# Patient Record
Sex: MALE | Race: WHITE | Employment: OTHER | URBAN - NONMETROPOLITAN AREA
[De-identification: names, ages, dates, MRNs, and addresses within clinical notes are randomized per-mention and may not be internally consistent; named-entity substitution may affect disease eponyms.]

---

## 2017-01-13 ENCOUNTER — OFFICE VISIT (OUTPATIENT)
Dept: CARDIOLOGY | Age: 67
End: 2017-01-13

## 2017-01-13 VITALS
HEIGHT: 68 IN | SYSTOLIC BLOOD PRESSURE: 118 MMHG | BODY MASS INDEX: 32.89 KG/M2 | DIASTOLIC BLOOD PRESSURE: 70 MMHG | HEART RATE: 59 BPM | WEIGHT: 217 LBS

## 2017-01-13 DIAGNOSIS — Z86.79 HISTORY OF CORONARY ARTERY DISEASE: Primary | ICD-10-CM

## 2017-01-13 DIAGNOSIS — E78.01 FAMILIAL HYPERCHOLESTEROLEMIA: ICD-10-CM

## 2017-01-13 DIAGNOSIS — I10 ESSENTIAL HYPERTENSION: ICD-10-CM

## 2017-01-13 PROCEDURE — G8419 CALC BMI OUT NRM PARAM NOF/U: HCPCS | Performed by: NUCLEAR MEDICINE

## 2017-01-13 PROCEDURE — G8484 FLU IMMUNIZE NO ADMIN: HCPCS | Performed by: NUCLEAR MEDICINE

## 2017-01-13 PROCEDURE — G8427 DOCREV CUR MEDS BY ELIG CLIN: HCPCS | Performed by: NUCLEAR MEDICINE

## 2017-01-13 PROCEDURE — 1123F ACP DISCUSS/DSCN MKR DOCD: CPT | Performed by: NUCLEAR MEDICINE

## 2017-01-13 PROCEDURE — G8598 ASA/ANTIPLAT THER USED: HCPCS | Performed by: NUCLEAR MEDICINE

## 2017-01-13 PROCEDURE — 3017F COLORECTAL CA SCREEN DOC REV: CPT | Performed by: NUCLEAR MEDICINE

## 2017-01-13 PROCEDURE — 4040F PNEUMOC VAC/ADMIN/RCVD: CPT | Performed by: NUCLEAR MEDICINE

## 2017-01-13 PROCEDURE — 99213 OFFICE O/P EST LOW 20 MIN: CPT | Performed by: NUCLEAR MEDICINE

## 2017-01-13 PROCEDURE — 93000 ELECTROCARDIOGRAM COMPLETE: CPT | Performed by: NUCLEAR MEDICINE

## 2017-01-13 PROCEDURE — 1036F TOBACCO NON-USER: CPT | Performed by: NUCLEAR MEDICINE

## 2017-01-13 RX ORDER — CARVEDILOL 25 MG/1
25 TABLET ORAL 2 TIMES DAILY WITH MEALS
Qty: 180 TABLET | Refills: 3 | Status: SHIPPED | OUTPATIENT
Start: 2017-01-13 | End: 2018-01-12 | Stop reason: SDUPTHER

## 2018-01-12 ENCOUNTER — OFFICE VISIT (OUTPATIENT)
Dept: CARDIOLOGY CLINIC | Age: 68
End: 2018-01-12
Payer: MEDICARE

## 2018-01-12 VITALS
WEIGHT: 227 LBS | BODY MASS INDEX: 34.4 KG/M2 | HEART RATE: 61 BPM | DIASTOLIC BLOOD PRESSURE: 60 MMHG | SYSTOLIC BLOOD PRESSURE: 122 MMHG | HEIGHT: 68 IN

## 2018-01-12 DIAGNOSIS — E78.01 FAMILIAL HYPERCHOLESTEROLEMIA: ICD-10-CM

## 2018-01-12 DIAGNOSIS — I25.10 ASHD (ARTERIOSCLEROTIC HEART DISEASE): Primary | ICD-10-CM

## 2018-01-12 DIAGNOSIS — I10 ESSENTIAL HYPERTENSION: ICD-10-CM

## 2018-01-12 PROCEDURE — 93000 ELECTROCARDIOGRAM COMPLETE: CPT | Performed by: NUCLEAR MEDICINE

## 2018-01-12 PROCEDURE — 99213 OFFICE O/P EST LOW 20 MIN: CPT | Performed by: NUCLEAR MEDICINE

## 2018-01-12 RX ORDER — CARVEDILOL 25 MG/1
25 TABLET ORAL 2 TIMES DAILY WITH MEALS
Qty: 180 TABLET | Refills: 3 | Status: SHIPPED | OUTPATIENT
Start: 2018-01-12 | End: 2019-01-17 | Stop reason: SDUPTHER

## 2018-01-12 RX ORDER — ROPINIROLE 5 MG/1
5 TABLET, FILM COATED ORAL 3 TIMES DAILY
COMMUNITY
End: 2018-12-30 | Stop reason: ALTCHOICE

## 2018-01-12 RX ORDER — CYCLOBENZAPRINE HCL 10 MG
10 TABLET ORAL 3 TIMES DAILY PRN
COMMUNITY
End: 2018-12-30 | Stop reason: ALTCHOICE

## 2018-01-12 RX ORDER — BUPROPION HYDROCHLORIDE 150 MG/1
150 TABLET, EXTENDED RELEASE ORAL 2 TIMES DAILY
COMMUNITY
End: 2018-12-30 | Stop reason: ALTCHOICE

## 2018-01-12 NOTE — PROGRESS NOTES
[facility]  [facility]  [address]  [address]  [address] [address]  Dept: [phone]  Dept Fax: [phone]  Loc: [phone]    Visit Date: 1/12/2018    Ronald Dash is a 69 y.o. male who presents today for:  Chief Complaint   Patient presents with    1 Year Follow Up    Coronary Artery Disease    Hyperlipidemia     No chest pain   Some dyspnea from asthma   Known mild CAD  Stable BP   No changes in breathing  No chest pain     HPI:  HPI  Past Medical History:   Diagnosis Date    ASHD (arteriosclerotic heart disease)     Depression     History of chest pain     History of coronary artery disease     HLD (hyperlipidemia)     HTN (hypertension)     Hypothyroidism     Obstructive sleep apnea on CPAP     Pneumonia     RLS (restless legs syndrome)       Past Surgical History:   Procedure Laterality Date    CARDIAC CATHETERIZATION  12/13/2007    Mild nonobstructive coronary artery disease. Normal left ventricular size and systolic function. EF 65%. No regional wall motion abnormalities. No MR    CARDIOVASCULAR STRESS TEST  05/07/2009    No evidence of Persantine induced ischemia, infarct, or scar. Normal wall motion and contractility. EF 68%. History reviewed. No pertinent family history.   Social History   Substance Use Topics    Smoking status: Former Smoker    Smokeless tobacco: Not on file    Alcohol use No      Current Outpatient Prescriptions   Medication Sig Dispense Refill    buPROPion (WELLBUTRIN SR) 150 MG extended release tablet Take 150 mg by mouth 2 times daily      cyclobenzaprine (FLEXERIL) 10 MG tablet Take 10 mg by mouth 3 times daily as needed for Muscle spasms      rOPINIRole (REQUIP) 5 MG tablet Take 5 mg by mouth 3 times daily      DULoxetine (CYMBALTA) 60 MG extended release capsule Take 60 mg by mouth daily      carvedilol (COREG) 25 MG tablet Take 1 tablet by mouth 2 times daily (with meals) 180 tablet 3    Nebulizers (NEBULIZER Well developed, well nourished  Lungs:    Clear to auscultation  Heart:    Normal S1 S2, Slight murmur. no rubs, no gallops  Abdomen:   Soft, non tender, no organomegalies, positive bowel sounds  Extremities:   No edema, no cyanosis, good peripheral pulses  Neurological:   Awake, alert, oriented. No obvious focal deficits  Musculoskelatal:  No obvious deformities    Assessment:     1. ASHD (arteriosclerotic heart disease)  EKG 12 Lead   2. Familial hypercholesterolemia     cardiac fair for now   ECG in office was done today. I reviewed the ECG. No acute findings      Plan:  No Follow-up on file. As above  Continue risk factor modification and medical management  Thank you for allowing me to participate in the care of your patient. Please don't hesitate to contact me regarding any further issues related to the patient care    Orders Placed:  Orders Placed This Encounter   Procedures    EKG 12 Lead     Order Specific Question:   Reason for Exam?     Answer: Other       Medications Prescribed:  No orders of the defined types were placed in this encounter. Discussed use, benefit, and side effects of prescribed medications. All patient questions answered. Pt voiced understanding. Instructed to continue current medications, diet and exercise. Continue risk factor modification and medical management. Patient agreed with treatment plan. Follow up as directed.     Electronically signed by Luis Daniel Worthy MD on 1/12/2018 at 11:18 AM

## 2018-01-19 LAB
ALBUMIN SERPL-MCNC: 3.9 G/DL (ref 3.2–5.3)
ALK PHOSPHATASE: 94 IU/L (ref 35–121)
ALT SERPL-CCNC: 14 IU/L (ref 5–59)
AST SERPL-CCNC: 18 IU/L (ref 10–42)
BILIRUB SERPL-MCNC: 0.5 MG/DL (ref 0.2–1.3)
BILIRUBIN DIRECT: 0.1 MG/DL (ref 0–0.2)
CHOLESTEROL/HDL RATIO: 2.7
CHOLESTEROL: 114 MG/DL
HDLC SERPL-MCNC: 43 MG/DL (ref 40–60)
LDL CHOLESTEROL CALCULATED: 53 MG/DL
LDL/HDL RATIO: 1.2
TOTAL PROTEIN: 6.2 G/DL (ref 5.8–8)
TRIGL SERPL-MCNC: 90 MG/DL
VLDLC SERPL CALC-MCNC: 18 MG/DL

## 2018-12-30 ENCOUNTER — HOSPITAL ENCOUNTER (EMERGENCY)
Age: 68
Discharge: HOME | End: 2018-12-30
Payer: MEDICARE

## 2018-12-30 VITALS
HEART RATE: 64 BPM | RESPIRATION RATE: 18 BRPM | SYSTOLIC BLOOD PRESSURE: 132 MMHG | DIASTOLIC BLOOD PRESSURE: 65 MMHG | OXYGEN SATURATION: 96 % | HEIGHT: 68 IN | TEMPERATURE: 97.9 F | BODY MASS INDEX: 33.34 KG/M2 | WEIGHT: 220 LBS

## 2018-12-30 DIAGNOSIS — R21 RASH AND OTHER NONSPECIFIC SKIN ERUPTION: ICD-10-CM

## 2018-12-30 DIAGNOSIS — J40 BRONCHITIS: Primary | ICD-10-CM

## 2018-12-30 PROCEDURE — 99213 OFFICE O/P EST LOW 20 MIN: CPT | Performed by: NURSE PRACTITIONER

## 2018-12-30 PROCEDURE — 99212 OFFICE O/P EST SF 10 MIN: CPT

## 2018-12-30 RX ORDER — FLUVOXAMINE MALEATE 50 MG/1
50 TABLET, COATED ORAL NIGHTLY
COMMUNITY
End: 2019-02-14

## 2018-12-30 RX ORDER — LEVOFLOXACIN 500 MG/1
500 TABLET, FILM COATED ORAL DAILY
Qty: 7 TABLET | Refills: 0 | Status: SHIPPED | OUTPATIENT
Start: 2018-12-30 | End: 2019-01-06

## 2018-12-30 RX ORDER — PREDNISONE 20 MG/1
40 TABLET ORAL DAILY
Qty: 14 TABLET | Refills: 0 | Status: SHIPPED | OUTPATIENT
Start: 2018-12-30 | End: 2019-01-06

## 2018-12-30 ASSESSMENT — ENCOUNTER SYMPTOMS
SHORTNESS OF BREATH: 0
COUGH: 1
ABDOMINAL PAIN: 0
WHEEZING: 1
VOMITING: 0
NAUSEA: 0
CHEST TIGHTNESS: 0

## 2019-01-17 RX ORDER — CARVEDILOL 25 MG/1
25 TABLET ORAL 2 TIMES DAILY WITH MEALS
Qty: 180 TABLET | Refills: 0 | Status: SHIPPED | OUTPATIENT
Start: 2019-01-17 | End: 2019-01-18 | Stop reason: SDUPTHER

## 2019-01-19 RX ORDER — CARVEDILOL 25 MG/1
25 TABLET ORAL 2 TIMES DAILY WITH MEALS
Qty: 180 TABLET | Refills: 0 | Status: SHIPPED | OUTPATIENT
Start: 2019-01-19 | End: 2019-04-12 | Stop reason: SDUPTHER

## 2019-02-14 ENCOUNTER — OFFICE VISIT (OUTPATIENT)
Dept: CARDIOLOGY CLINIC | Age: 69
End: 2019-02-14
Payer: MEDICARE

## 2019-02-14 VITALS
HEART RATE: 57 BPM | WEIGHT: 220 LBS | HEIGHT: 68 IN | SYSTOLIC BLOOD PRESSURE: 134 MMHG | DIASTOLIC BLOOD PRESSURE: 70 MMHG | BODY MASS INDEX: 33.34 KG/M2

## 2019-02-14 DIAGNOSIS — I10 ESSENTIAL HYPERTENSION: Primary | ICD-10-CM

## 2019-02-14 DIAGNOSIS — E78.5 HYPERLIPIDEMIA, UNSPECIFIED HYPERLIPIDEMIA TYPE: ICD-10-CM

## 2019-02-14 DIAGNOSIS — I25.10 ASHD (ARTERIOSCLEROTIC HEART DISEASE): ICD-10-CM

## 2019-02-14 DIAGNOSIS — Z86.79 HISTORY OF CORONARY ARTERY DISEASE: ICD-10-CM

## 2019-02-14 PROCEDURE — 93000 ELECTROCARDIOGRAM COMPLETE: CPT | Performed by: NUCLEAR MEDICINE

## 2019-02-14 PROCEDURE — 99213 OFFICE O/P EST LOW 20 MIN: CPT | Performed by: NUCLEAR MEDICINE

## 2019-02-14 RX ORDER — ALPRAZOLAM 0.25 MG/1
0.25 TABLET ORAL NIGHTLY PRN
COMMUNITY

## 2019-02-14 RX ORDER — BRIMONIDINE TARTRATE 2 MG/ML
1 SOLUTION/ DROPS OPHTHALMIC 3 TIMES DAILY
COMMUNITY
End: 2021-03-29

## 2019-02-14 RX ORDER — MULTIVIT WITH MINERALS/LUTEIN
250 TABLET ORAL DAILY
COMMUNITY

## 2019-04-12 RX ORDER — CARVEDILOL 25 MG/1
25 TABLET ORAL 2 TIMES DAILY WITH MEALS
Qty: 180 TABLET | Refills: 3 | Status: SHIPPED | OUTPATIENT
Start: 2019-04-12 | End: 2020-04-08 | Stop reason: SDUPTHER

## 2019-04-12 NOTE — TELEPHONE ENCOUNTER
Sven Chaudhary called requesting a refill on the following medications:  Requested Prescriptions     Pending Prescriptions Disp Refills    carvedilol (COREG) 25 MG tablet 180 tablet 0     Sig: Take 1 tablet by mouth 2 times daily (with meals)     Pharmacy verified:  .pv    [facility]    Date of last visit: 2/14/19  Date of next visit (if applicable): 8/47/85

## 2019-07-13 ENCOUNTER — HOSPITAL ENCOUNTER (EMERGENCY)
Age: 69
Discharge: HOME | End: 2019-07-13
Attending: EMERGENCY MEDICINE
Payer: MEDICARE

## 2019-07-13 VITALS
WEIGHT: 218 LBS | RESPIRATION RATE: 22 BRPM | DIASTOLIC BLOOD PRESSURE: 60 MMHG | HEART RATE: 56 BPM | HEIGHT: 68 IN | BODY MASS INDEX: 33.04 KG/M2 | TEMPERATURE: 98.5 F | SYSTOLIC BLOOD PRESSURE: 125 MMHG | OXYGEN SATURATION: 95 %

## 2019-07-13 DIAGNOSIS — J44.1 COPD WITH ACUTE EXACERBATION (HCC): ICD-10-CM

## 2019-07-13 DIAGNOSIS — J20.9 ACUTE BRONCHITIS DUE TO INFECTION: Primary | ICD-10-CM

## 2019-07-13 PROCEDURE — 99214 OFFICE O/P EST MOD 30 MIN: CPT | Performed by: EMERGENCY MEDICINE

## 2019-07-13 PROCEDURE — 99213 OFFICE O/P EST LOW 20 MIN: CPT

## 2019-07-13 RX ORDER — PREDNISONE 20 MG/1
20 TABLET ORAL DAILY
Qty: 7 TABLET | Refills: 0 | Status: SHIPPED | OUTPATIENT
Start: 2019-07-13 | End: 2019-07-20

## 2019-07-13 RX ORDER — ALBUTEROL SULFATE 2.5 MG/3ML
2.5 SOLUTION RESPIRATORY (INHALATION) EVERY 6 HOURS PRN
COMMUNITY
End: 2021-03-29

## 2019-07-13 RX ORDER — DOXYCYCLINE HYCLATE 100 MG
100 TABLET ORAL 2 TIMES DAILY
Qty: 14 TABLET | Refills: 0 | Status: SHIPPED | OUTPATIENT
Start: 2019-07-13 | End: 2019-07-20

## 2019-07-13 RX ORDER — RISPERIDONE 0.5 MG/1
0.5 TABLET, FILM COATED ORAL EVERY EVENING
COMMUNITY
End: 2020-02-19 | Stop reason: ALTCHOICE

## 2019-07-13 RX ORDER — LATANOPROST 50 UG/ML
1 SOLUTION/ DROPS OPHTHALMIC NIGHTLY
COMMUNITY

## 2019-07-13 ASSESSMENT — ENCOUNTER SYMPTOMS
TROUBLE SWALLOWING: 0
SINUS PRESSURE: 0
COUGH: 1
EYE DISCHARGE: 0
BACK PAIN: 0
EYE PAIN: 0
STRIDOR: 0
SHORTNESS OF BREATH: 1
VOICE CHANGE: 0
DIARRHEA: 0
NAUSEA: 0
WHEEZING: 1
EYE REDNESS: 0
VOMITING: 0
SORE THROAT: 0
ABDOMINAL PAIN: 0

## 2019-07-13 NOTE — ED NOTES
No change in patients condition. Discharge instructions and prescriptions discussed with pt. Pt. Verbalized understanding of info given and ambulated to exit in stable condition.       Dickson Saldaña RN  07/13/19 [id number]

## 2020-02-17 ENCOUNTER — HOSPITAL ENCOUNTER (OUTPATIENT)
Age: 70
Setting detail: SPECIMEN
Discharge: HOME | End: 2020-02-17
Payer: MEDICARE

## 2020-02-17 LAB
ALBUMIN SERPL-MCNC: 3.6 G/DL (ref 3.5–5.2)
ALBUMIN/GLOBULIN RATIO: 1.3 (ref 1–2.5)
ALP BLD-CCNC: 107 U/L (ref 40–129)
ALT SERPL-CCNC: 18 U/L (ref 5–41)
AST SERPL-CCNC: 23 U/L
BILIRUB SERPL-MCNC: 0.27 MG/DL (ref 0.3–1.2)
BILIRUBIN DIRECT: 0.09 MG/DL
BILIRUBIN, INDIRECT: 0.18 MG/DL (ref 0–1)
CHOLESTEROL/HDL RATIO: 2.5
CHOLESTEROL: 111 MG/DL
GLOBULIN: ABNORMAL G/DL (ref 1.5–3.8)
HDLC SERPL-MCNC: 44 MG/DL
LDL CHOLESTEROL: 52 MG/DL (ref 0–130)
TOTAL PROTEIN: 6.3 G/DL (ref 6.4–8.3)
TRIGL SERPL-MCNC: 74 MG/DL
VLDLC SERPL CALC-MCNC: NORMAL MG/DL (ref 1–30)

## 2020-02-19 ENCOUNTER — OFFICE VISIT (OUTPATIENT)
Dept: CARDIOLOGY CLINIC | Age: 70
End: 2020-02-19
Payer: MEDICARE

## 2020-02-19 VITALS
SYSTOLIC BLOOD PRESSURE: 136 MMHG | HEIGHT: 68 IN | WEIGHT: 223 LBS | DIASTOLIC BLOOD PRESSURE: 64 MMHG | HEART RATE: 57 BPM | BODY MASS INDEX: 33.8 KG/M2

## 2020-02-19 PROCEDURE — 93000 ELECTROCARDIOGRAM COMPLETE: CPT | Performed by: NUCLEAR MEDICINE

## 2020-02-19 PROCEDURE — 99213 OFFICE O/P EST LOW 20 MIN: CPT | Performed by: NUCLEAR MEDICINE

## 2020-02-19 RX ORDER — QUETIAPINE FUMARATE 100 MG/1
300 TABLET, FILM COATED ORAL NIGHTLY
COMMUNITY

## 2020-02-19 RX ORDER — QUETIAPINE FUMARATE 100 MG/1
100 TABLET, FILM COATED ORAL EVERY MORNING
COMMUNITY
End: 2021-02-19

## 2020-02-19 NOTE — PROGRESS NOTES
carvedilol (COREG) 25 MG tablet Take 1 tablet by mouth 2 times daily (with meals) 180 tablet 3    ALPRAZolam (XANAX) 0.25 MG tablet Take 0.25 mg by mouth nightly as needed for Sleep. Yovany Molatonya brimonidine (ALPHAGAN) 0.2 % ophthalmic solution 1 drop 3 times daily      Ascorbic Acid (VITAMIN C) 250 MG tablet Take 250 mg by mouth daily      DULoxetine (CYMBALTA) 60 MG extended release capsule Take 30 mg by mouth daily       budesonide-formoterol (SYMBICORT) 80-4.5 MCG/ACT AERO Inhale 2 puffs into the lungs 2 times daily      Nebulizers (NEBULIZER COMPRESSOR) MISC Take 1 vial by nebulization 4 times daily DX   acute bronchitis    ICD 10 (J20.9) 1 each 0    acetaminophen (TYLENOL) 500 MG tablet Take 500 mg by mouth every 6 hours as needed for Pain.  Multiple Vitamins-Minerals (MULTIVITAMIN PO) Take  by mouth.  simvastatin (ZOCOR) 40 MG tablet Take 1 tablet by mouth nightly. 90 tablet 3    levothyroxine (SYNTHROID) 100 MCG tablet Take 100 mcg by mouth daily       aspirin 81 MG chewable tablet Take 81 mg by mouth daily.  vitamin B-12 (CYANOCOBALAMIN) 1000 MCG tablet Take 1,000 mcg by mouth daily.  Coenzyme Q10 (CO Q-10) 120 MG CAPS Take  by mouth. No current facility-administered medications for this visit.       Allergies   Allergen Reactions    Sulfa Antibiotics Other (See Comments)     Dizzy and chest tightness     Health Maintenance   Topic Date Due    TSH testing  1950    AAA screen  1950    Hepatitis C screen  1950    DTaP/Tdap/Td vaccine (1 - Tdap) 07/05/1959    Shingles Vaccine (1 of 2) 07/05/1998    Colon cancer screen colonoscopy  07/05/1998    Pneumococcal 65+ years Vaccine (1 of 1 - PPSV23) 07/05/2013    Annual Wellness Visit (AWV)  06/23/2019    Flu vaccine (1) 09/01/2019    Lipid screen  02/17/2021    Hepatitis A vaccine  Aged Out    Hepatitis B vaccine  Aged Out    Hib vaccine  Aged Out    Meningococcal (ACWY) vaccine  Aged Out Subjective:  Review of Systems  General:   No fever, no chills, No fatigue or weight loss  Pulmonary:    No dyspnea, no wheezing  Cardiac:    Denies recent chest pain,   GI:     No nausea or vomiting, no abdominal pain  Neuro:    No dizziness or light headedness,   Musculoskeletal:  No recent active issues  Extremities:   No edema, no obvious claudication       Objective:  Physical Exam  /64   Pulse 57   Ht 5' 8\" (1.727 m)   Wt 223 lb (101.2 kg)   BMI 33.91 kg/m²   General:   Well developed, well nourished  Lungs:   Clear to auscultation  Heart:    Normal S1 S2, Slight murmur. no rubs, no gallops  Abdomen:   Soft, non tender, no organomegalies, positive bowel sounds  Extremities:   No edema, no cyanosis, good peripheral pulses  Neurological:   Awake, alert, oriented. No obvious focal deficits  Musculoskelatal:  No obvious deformities    Assessment:      Diagnosis Orders   1. Essential hypertension  EKG 12 lead   2. ASHD (arteriosclerotic heart disease)  EKG 12 lead   3. Familial hypercholesterolemia     cardiac fair for now  ECG in office was done today. I reviewed the ECG. No acute findings      Plan:  No follow-ups on file. As above  Continue risk factor modification and medical management  Thank you for allowing me to participate in the care of your patient. Please don't hesitate to contact me regarding any further issues related to the patient care    Orders Placed:  Orders Placed This Encounter   Procedures    EKG 12 lead     Order Specific Question:   Reason for Exam?     Answer: Other       Medications Prescribed:  No orders of the defined types were placed in this encounter. Discussed use, benefit, and side effects of prescribed medications. All patient questions answered. Pt voicedunderstanding. Instructed to continue current medications, diet and exercise. Continue risk factor modification and medical management. Patient agreed with treatment plan.  Follow up as

## 2020-04-07 NOTE — TELEPHONE ENCOUNTER
Bryan Venegas called requesting a refill on the following medications:  Requested Prescriptions     Pending Prescriptions Disp Refills    carvedilol (COREG) 25 MG tablet 180 tablet 3     Sig: Take 1 tablet by mouth 2 times daily (with meals)     Pharmacy verified:  .pv    [facility]    Date of last visit: 02/19/20  Date of next visit (if applicable):02/19/21

## 2020-04-08 RX ORDER — CARVEDILOL 25 MG/1
25 TABLET ORAL 2 TIMES DAILY WITH MEALS
Qty: 180 TABLET | Refills: 3 | Status: SHIPPED | OUTPATIENT
Start: 2020-04-08 | End: 2021-02-19 | Stop reason: SDUPTHER

## 2021-02-19 ENCOUNTER — OFFICE VISIT (OUTPATIENT)
Dept: CARDIOLOGY CLINIC | Age: 71
End: 2021-02-19
Payer: MEDICARE

## 2021-02-19 VITALS
WEIGHT: 220 LBS | SYSTOLIC BLOOD PRESSURE: 130 MMHG | BODY MASS INDEX: 33.45 KG/M2 | DIASTOLIC BLOOD PRESSURE: 64 MMHG | HEART RATE: 55 BPM

## 2021-02-19 DIAGNOSIS — I10 ESSENTIAL HYPERTENSION: ICD-10-CM

## 2021-02-19 DIAGNOSIS — I25.10 CORONARY ARTERY DISEASE INVOLVING NATIVE CORONARY ARTERY OF NATIVE HEART WITHOUT ANGINA PECTORIS: ICD-10-CM

## 2021-02-19 DIAGNOSIS — R06.02 SHORTNESS OF BREATH: Primary | ICD-10-CM

## 2021-02-19 PROCEDURE — 93000 ELECTROCARDIOGRAM COMPLETE: CPT | Performed by: NUCLEAR MEDICINE

## 2021-02-19 PROCEDURE — 99213 OFFICE O/P EST LOW 20 MIN: CPT | Performed by: NUCLEAR MEDICINE

## 2021-02-19 RX ORDER — SIMVASTATIN 40 MG
40 TABLET ORAL NIGHTLY
Qty: 90 TABLET | Refills: 3 | Status: SHIPPED | OUTPATIENT
Start: 2021-02-19 | End: 2022-02-25 | Stop reason: SDUPTHER

## 2021-02-19 RX ORDER — VITAMIN B COMPLEX
1000 TABLET ORAL DAILY
COMMUNITY

## 2021-02-19 RX ORDER — CARVEDILOL 25 MG/1
25 TABLET ORAL DAILY
Qty: 90 TABLET | Refills: 3 | Status: SHIPPED | OUTPATIENT
Start: 2021-02-19 | End: 2021-06-01 | Stop reason: SDUPTHER

## 2021-02-19 NOTE — PROGRESS NOTES
[address] [facility]  [address] [place] [address]  Dept: [phone]  Dept Fax: [phone]  Loc: [phone]    Visit Date: 2/19/2021    Micheline Jacob is a 65 y.o. male who presents todayfor:  Chief Complaint   Patient presents with    1 Year Follow Up    Coronary Artery Disease    Hypertension    Hyperlipidemia     Know mild CAD  No chest pain   No changes in breathing  Some baseline dyspnea  Bp is stable  No dizziness   No syncope  Active     HPI:  HPI  Past Medical History:   Diagnosis Date    ASHD (arteriosclerotic heart disease)     Depression     History of chest pain     History of coronary artery disease     HLD (hyperlipidemia)     HTN (hypertension)     Hypothyroidism     Obstructive sleep apnea on CPAP     Pneumonia     RLS (restless legs syndrome)       Past Surgical History:   Procedure Laterality Date    CARDIAC CATHETERIZATION  12/13/2007    Mild nonobstructive coronary artery disease. Normal left ventricular size and systolic function. EF 65%. No regional wall motion abnormalities. No MR    CARDIOVASCULAR STRESS TEST  05/07/2009    No evidence of Persantine induced ischemia, infarct, or scar. Normal wall motion and contractility. EF 68%.       Family History   Problem Relation Age of Onset    Arthritis Mother     Other Mother         pneumonia cause of death    Arthritis Father     Stroke Father      Social History     Tobacco Use    Smoking status: Former Smoker    Smokeless tobacco: Never Used   Substance Use Topics    Alcohol use: No      Current Outpatient Medications   Medication Sig Dispense Refill    Vitamin D (CHOLECALCIFEROL) 25 MCG (1000 UT) TABS tablet Take 1,000 Units by mouth daily      carvedilol (COREG) 25 MG tablet Take 1 tablet by mouth 2 times daily (with meals) (Patient taking differently: Take 25 mg by mouth daily ) 180 tablet 3  QUEtiapine (SEROQUEL) 100 MG tablet Take 300 mg by mouth nightly       albuterol (PROVENTIL) (2.5 MG/3ML) 0.083% nebulizer solution Take 2.5 mg by nebulization every 6 hours as needed for Wheezing      latanoprost (XALATAN) 0.005 % ophthalmic solution 1 drop nightly      ALPRAZolam (XANAX) 0.25 MG tablet Take 0.25 mg by mouth nightly as needed for Sleep. [place] brimonidine (ALPHAGAN) 0.2 % ophthalmic solution 1 drop 3 times daily      Ascorbic Acid (VITAMIN C) 250 MG tablet Take 250 mg by mouth daily      DULoxetine HCl 30 MG CSDR Take 30 mg by mouth daily       budesonide-formoterol (SYMBICORT) 80-4.5 MCG/ACT AERO Inhale 2 puffs into the lungs 2 times daily      Nebulizers (NEBULIZER COMPRESSOR) MISC Take 1 vial by nebulization 4 times daily DX   acute bronchitis    ICD 10 (J20.9) 1 each 0    acetaminophen (TYLENOL) 500 MG tablet Take 500 mg by mouth every 6 hours as needed for Pain.  Multiple Vitamins-Minerals (MULTIVITAMIN PO) Take  by mouth.  simvastatin (ZOCOR) 40 MG tablet Take 1 tablet by mouth nightly. 90 tablet 3    levothyroxine (SYNTHROID) 100 MCG tablet Take 100 mcg by mouth daily       aspirin 81 MG chewable tablet Take 81 mg by mouth daily.  vitamin B-12 (CYANOCOBALAMIN) 1000 MCG tablet Take 1,000 mcg by mouth daily.  Coenzyme Q10 (CO Q-10) 120 MG CAPS Take  by mouth. No current facility-administered medications for this visit.       Allergies   Allergen Reactions    Sulfa Antibiotics Other (See Comments)     Dizzy and chest tightness     Health Maintenance   Topic Date Due    TSH testing  1950    AAA screen  1950    Hepatitis C screen  1950    COVID-19 Vaccine (1 of 2) 07/05/1964    DTaP/Tdap/Td vaccine (1 - Tdap) 07/05/1967    Shingles Vaccine (1 of 2) 07/05/1998    Colon cancer screen colonoscopy  07/05/1998    Pneumococcal 65+ years Vaccine (1 of 1 - PPSV23) 07/05/2013    Annual Wellness Visit (AWV)  06/23/2019  Flu vaccine (1) 09/01/2020    Lipid screen  02/17/2021    Hepatitis A vaccine  Aged Out    Hepatitis B vaccine  Aged Out    Hib vaccine  Aged Out    Meningococcal (ACWY) vaccine  Aged Out       Subjective:  Review of Systems  General:   No fever, no chills, No fatigue or weight loss  Pulmonary:    No dyspnea, no wheezing  Cardiac:    Denies recent chest pain,   GI:     No nausea or vomiting, no abdominal pain  Neuro:    No dizziness or light headedness,   Musculoskeletal:  No recent active issues  Extremities:   No edema, no obvious claudication       Objective:  Physical Exam  /64   Pulse 55   Wt 220 lb (99.8 kg)   BMI 33.45 kg/m²   General:   Well developed, well nourished  Lungs:   Clear to auscultation  Heart:    Normal S1 S2, Slight murmur. no rubs, no gallops  Abdomen:   Soft, non tender, no organomegalies, positive bowel sounds  Extremities:   No edema, no cyanosis, good peripheral pulses  Neurological:   Awake, alert, oriented. No obvious focal deficits  Musculoskelatal:  No obvious deformities    Assessment:      Diagnosis Orders   1. Shortness of breath  EKG 12 lead   2. Coronary artery disease involving native coronary artery of native heart without angina pectoris     3. Essential hypertension     as above  Cardiac fair for now   ECG in office was done today. I reviewed the ECG. No acute findings    Plan:  No follow-ups on file. As above  Check lipids  Continue risk factor modification and medical management. ty    Orders Placed:  Orders Placed This Encounter   Procedures    EKG 12 lead     Order Specific Question:   Reason for Exam?     Answer: Other       Medications Prescribed:  No orders of the defined types were placed in this encounter. Discussed use, benefit, and side effects of prescribed medications. All patient questions answered. Pt voicedunderstanding. Instructed to continue current medications, diet and exercise. Continue risk factor modification and medical management. Patient agreed with treatment plan. Follow up as directed.     Electronically signedby Tico Garcia MD on 2/19/2021 at 1:47 PM

## 2021-02-23 LAB
ALBUMIN SERPL-MCNC: 3.8 G/DL (ref 3.2–5.3)
ALK PHOSPHATASE: 101 U/L (ref 39–130)
ALT SERPL-CCNC: 15 U/L (ref 0–40)
AST SERPL-CCNC: 19 U/L (ref 0–41)
BILIRUB SERPL-MCNC: 0.4 MG/DL (ref 0.3–1.2)
BILIRUBIN DIRECT: 0.1 MG/DL (ref 0–0.4)
CHOLESTEROL/HDL RATIO: 2.8 (ref 1–5)
CHOLESTEROL: 97 MG/DL (ref 150–200)
HDLC SERPL-MCNC: 35 MG/DL
LDL CHOLESTEROL CALCULATED: 46 MG/DL
LDL/HDL RATIO: 1.3
TOTAL PROTEIN: 6.1 G/DL (ref 6–8)
TRIGL SERPL-MCNC: 79 MG/DL (ref 27–150)
VLDLC SERPL CALC-MCNC: 16 MG/DL (ref 0–30)

## 2021-06-01 RX ORDER — CARVEDILOL 25 MG/1
25 TABLET ORAL 2 TIMES DAILY WITH MEALS
Qty: 30 TABLET | Refills: 0 | Status: SHIPPED | OUTPATIENT
Start: 2021-06-01 | End: 2022-02-25 | Stop reason: SDUPTHER

## 2021-06-02 RX ORDER — CARVEDILOL 25 MG/1
25 TABLET ORAL 2 TIMES DAILY
Qty: 180 TABLET | Refills: 3 | Status: CANCELLED | OUTPATIENT
Start: 2021-06-02

## 2021-06-02 RX ORDER — CARVEDILOL 25 MG/1
25 TABLET ORAL 2 TIMES DAILY
Qty: 180 TABLET | Refills: 1 | Status: SHIPPED | OUTPATIENT
Start: 2021-06-02 | End: 2022-02-25 | Stop reason: SDUPTHER

## 2022-02-25 ENCOUNTER — OFFICE VISIT (OUTPATIENT)
Dept: CARDIOLOGY CLINIC | Age: 72
End: 2022-02-25
Payer: COMMERCIAL

## 2022-02-25 VITALS
BODY MASS INDEX: 33.25 KG/M2 | WEIGHT: 219.4 LBS | DIASTOLIC BLOOD PRESSURE: 72 MMHG | SYSTOLIC BLOOD PRESSURE: 121 MMHG | HEIGHT: 68 IN | HEART RATE: 54 BPM

## 2022-02-25 DIAGNOSIS — Z86.79 HISTORY OF CORONARY ARTERY DISEASE: ICD-10-CM

## 2022-02-25 DIAGNOSIS — I10 PRIMARY HYPERTENSION: ICD-10-CM

## 2022-02-25 DIAGNOSIS — I25.10 ASHD (ARTERIOSCLEROTIC HEART DISEASE): Primary | ICD-10-CM

## 2022-02-25 PROCEDURE — 99213 OFFICE O/P EST LOW 20 MIN: CPT | Performed by: NUCLEAR MEDICINE

## 2022-02-25 PROCEDURE — 93000 ELECTROCARDIOGRAM COMPLETE: CPT | Performed by: NUCLEAR MEDICINE

## 2022-02-25 RX ORDER — OMEPRAZOLE 20 MG/1
CAPSULE, DELAYED RELEASE ORAL
COMMUNITY
Start: 2022-01-05

## 2022-02-25 RX ORDER — CARVEDILOL 25 MG/1
25 TABLET ORAL 2 TIMES DAILY
Qty: 180 TABLET | Refills: 3 | Status: SHIPPED | OUTPATIENT
Start: 2022-02-25

## 2022-02-25 RX ORDER — SIMVASTATIN 40 MG
40 TABLET ORAL NIGHTLY
Qty: 90 TABLET | Refills: 3 | Status: SHIPPED | OUTPATIENT
Start: 2022-02-25 | End: 2022-04-07 | Stop reason: SDUPTHER

## 2022-02-25 NOTE — PROGRESS NOTES
[facility] [address] [place] [address].  [address] [place] [address]  Dept: [phone]  Dept Fax: [phone]  Loc: [phone]    Visit Date: 2/25/2022    Alix Cope is a 66 y.o. male who presents todayfor:  Chief Complaint   Patient presents with    1 Year Follow Up    Coronary Artery Disease    Hypertension    Hyperlipidemia     Know mild CAD   No chest pain   No changes in breathing  Active   Bp is stable   No dizziness  No syncope      HPI:  HPI  Past Medical History:   Diagnosis Date    ASHD (arteriosclerotic heart disease)     Depression     History of chest pain     History of coronary artery disease     HLD (hyperlipidemia)     HTN (hypertension)     Hypothyroidism     Obstructive sleep apnea on CPAP     Pneumonia     RLS (restless legs syndrome)       Past Surgical History:   Procedure Laterality Date    CARDIAC CATHETERIZATION  12/13/2007    Mild nonobstructive coronary artery disease. Normal left ventricular size and systolic function. EF 65%. No regional wall motion abnormalities. No MR    CARDIOVASCULAR STRESS TEST  05/07/2009    No evidence of Persantine induced ischemia, infarct, or scar. Normal wall motion and contractility. EF 68%.       Family History   Problem Relation Age of Onset    Arthritis Mother     Other Mother         pneumonia cause of death    Arthritis Father     Stroke Father      Social History     Tobacco Use    Smoking status: Former Smoker    Smokeless tobacco: Never Used   Substance Use Topics    Alcohol use: No      Current Outpatient Medications   Medication Sig Dispense Refill    omeprazole (PRILOSEC) 20 MG delayed release capsule TAKE 1 CAPSULE BY MOUTH EVERY MORNING      carvedilol (COREG) 25 MG tablet Take 1 tablet by mouth 2 times daily 180 tablet 1    Vitamin D (CHOLECALCIFEROL) 25 MCG (1000 UT) TABS tablet Take 1,000 Units by mouth daily      simvastatin (ZOCOR) 40 MG tablet Take 1 tablet by mouth nightly 90 tablet 3    QUEtiapine (SEROQUEL) 100 MG tablet Take 300 mg by mouth nightly       latanoprost (XALATAN) 0.005 % ophthalmic solution 1 drop nightly      ALPRAZolam (XANAX) 0.25 MG tablet Take 0.25 mg by mouth nightly as needed for Sleep. Redd Fonseca Ascorbic Acid (VITAMIN C) 250 MG tablet Take 250 mg by mouth daily      DULoxetine HCl 30 MG CSDR Take 30 mg by mouth daily       Nebulizers (NEBULIZER COMPRESSOR) MISC Take 1 vial by nebulization 4 times daily DX   acute bronchitis    ICD 10 (J20.9) 1 each 0    Multiple Vitamins-Minerals (MULTIVITAMIN PO) Take  by mouth.  levothyroxine (SYNTHROID) 100 MCG tablet Take 100 mcg by mouth daily       aspirin 81 MG chewable tablet Take 81 mg by mouth daily.  vitamin B-12 (CYANOCOBALAMIN) 1000 MCG tablet Take 1,000 mcg by mouth daily.  Coenzyme Q10 (CO Q-10) 120 MG CAPS Take  by mouth. No current facility-administered medications for this visit.      Allergies   Allergen Reactions    Sulfa Antibiotics Other (See Comments)     Dizzy and chest tightness     Health Maintenance   Topic Date Due    TSH testing  Never done    Hepatitis C screen  Never done    Depression Monitoring  Never done    Colorectal Cancer Screen  Never done    AAA screen  Never done    DTaP/Tdap/Td vaccine (2 - Tdap) 07/11/2018    Annual Wellness Visit (AWV)  Never done    COVID-19 Vaccine (3 - Booster for Wu Peter series) 09/23/2021    Lipid screen  02/22/2022    Flu vaccine  Completed    Shingles Vaccine  Completed    Pneumococcal 65+ years Vaccine  Completed    Hepatitis A vaccine  Aged Out    Hepatitis B vaccine  Aged Out    Hib vaccine  Aged Out    Meningococcal (ACWY) vaccine  Aged Out       Subjective:  Review of Systems  General:   No fever, no chills, No fatigue or weight loss  Pulmonary:    No dyspnea, no wheezing  Cardiac:    Denies recent chest pain,   GI:     No nausea or vomiting, no abdominal pain  Neuro:    No dizziness or light headedness,   Musculoskeletal:  No recent active issues  Extremities:   No edema, no obvious claudication       Objective:  Physical Exam  /72   Pulse 54   Ht 5' 8\" (1.727 m)   Wt 219 lb 6.4 oz (99.5 kg)   BMI 33.36 kg/m²   General:   Well developed, well nourished  Lungs:   Clear to auscultation  Heart:    Normal S1 S2, Slight murmur. no rubs, no gallops  Abdomen:   Soft, non tender, no organomegalies, positive bowel sounds  Extremities:   No edema, no cyanosis, good peripheral pulses  Neurological:   Awake, alert, oriented. No obvious focal deficits  Musculoskelatal:  No obvious deformities    Assessment:      Diagnosis Orders   1. ASHD (arteriosclerotic heart disease)  EKG 12 lead   2. Primary hypertension  EKG 12 lead   3. History of coronary artery disease  EKG 12 lead   as above  Cardiac fair for no w  ECG in office was done today. I reviewed the ECG. No acute findings    Plan:  No follow-ups on file. Lipids  Continue risk factor modification and medical management  Thank you for allowing me to participate in the care of your patient. Please don't hesitate to contact me regarding any further issues related to the patient care    Orders Placed:  Orders Placed This Encounter   Procedures    EKG 12 lead     Order Specific Question:   Reason for Exam?     Answer: Other       Medications Prescribed:  No orders of the defined types were placed in this encounter. Discussed use, benefit, and side effects of prescribed medications. All patient questions answered. Pt voicedunderstanding. Instructed to continue current medications, diet and exercise. Continue risk factor modification and medical management. Patient agreed with treatment plan. Follow up as directed.     Electronically signedby Anabelle Brice MD on 2/25/2022 at 1:11 PM

## 2022-02-27 LAB
ALBUMIN SERPL-MCNC: 3.7 G/DL (ref 3.2–5.3)
ALK PHOSPHATASE: 84 U/L (ref 39–130)
ALT SERPL-CCNC: 19 U/L (ref 0–40)
AST SERPL-CCNC: 21 U/L (ref 0–41)
BILIRUB SERPL-MCNC: 0.4 MG/DL (ref 0.3–1.2)
BILIRUBIN DIRECT: 0.1 MG/DL (ref 0–0.4)
CHOLESTEROL/HDL RATIO: 2.7 (ref 1–5)
CHOLESTEROL: 100 MG/DL (ref 150–200)
HDLC SERPL-MCNC: 37 MG/DL
LDL CHOLESTEROL CALCULATED: 46 MG/DL
LDL/HDL RATIO: 1.2
TOTAL PROTEIN: 6.2 G/DL (ref 6–8)
TRIGL SERPL-MCNC: 84 MG/DL (ref 27–150)
VLDLC SERPL CALC-MCNC: 17 MG/DL (ref 0–30)

## 2022-04-07 RX ORDER — SIMVASTATIN 20 MG
20 TABLET ORAL NIGHTLY
Qty: 90 TABLET | Refills: 3 | Status: SHIPPED | OUTPATIENT
Start: 2022-04-07

## 2023-02-27 RX ORDER — SIMVASTATIN 20 MG
TABLET ORAL
Qty: 90 TABLET | Refills: 3 | Status: SHIPPED | OUTPATIENT
Start: 2023-02-27

## 2023-03-31 ENCOUNTER — OFFICE VISIT (OUTPATIENT)
Dept: CARDIOLOGY CLINIC | Age: 73
End: 2023-03-31
Payer: MEDICARE

## 2023-03-31 VITALS
BODY MASS INDEX: 31.04 KG/M2 | SYSTOLIC BLOOD PRESSURE: 123 MMHG | DIASTOLIC BLOOD PRESSURE: 72 MMHG | HEART RATE: 56 BPM | WEIGHT: 204.8 LBS | HEIGHT: 68 IN

## 2023-03-31 DIAGNOSIS — I10 PRIMARY HYPERTENSION: Primary | ICD-10-CM

## 2023-03-31 DIAGNOSIS — Z86.79 HISTORY OF CORONARY ARTERY DISEASE: ICD-10-CM

## 2023-03-31 PROCEDURE — 3078F DIAST BP <80 MM HG: CPT | Performed by: NUCLEAR MEDICINE

## 2023-03-31 PROCEDURE — 93000 ELECTROCARDIOGRAM COMPLETE: CPT | Performed by: NUCLEAR MEDICINE

## 2023-03-31 PROCEDURE — 99213 OFFICE O/P EST LOW 20 MIN: CPT | Performed by: NUCLEAR MEDICINE

## 2023-03-31 PROCEDURE — 3074F SYST BP LT 130 MM HG: CPT | Performed by: NUCLEAR MEDICINE

## 2023-03-31 PROCEDURE — 1123F ACP DISCUSS/DSCN MKR DOCD: CPT | Performed by: NUCLEAR MEDICINE

## 2023-03-31 RX ORDER — QUETIAPINE FUMARATE 300 MG/1
300 TABLET, FILM COATED ORAL 2 TIMES DAILY
COMMUNITY

## 2023-03-31 RX ORDER — CETIRIZINE HYDROCHLORIDE 10 MG/1
10 TABLET ORAL DAILY
COMMUNITY

## 2023-03-31 RX ORDER — DONEPEZIL HYDROCHLORIDE 5 MG/1
5 TABLET, FILM COATED ORAL NIGHTLY
COMMUNITY

## 2023-03-31 RX ORDER — BIMATOPROST 0.3 MG/ML
1 SOLUTION/ DROPS OPHTHALMIC NIGHTLY
COMMUNITY

## 2023-03-31 RX ORDER — BUPROPION HYDROCHLORIDE 150 MG/1
150 TABLET ORAL EVERY MORNING
COMMUNITY

## 2023-03-31 NOTE — PROGRESS NOTES
[address] [address]  [address]  [facility] [address]  Dept: [phone]  Dept Fax: [phone]  Loc: [phone]    Visit Date: 3/31/2023    Barron Simeon is a 74 y.o. male who presents todayfor:  Chief Complaint   Patient presents with    1 Year Follow Up    Hypertension    Coronary Artery Disease   Cath before with mild CAD  No chest pain   No changes in breathing  BP is stable  No dizziness  No syncope  Active         HPI:  HPI  Past Medical History:   Diagnosis Date    ASHD (arteriosclerotic heart disease)     Dementia ([address] 11/2022    Depression     History of chest pain     History of coronary artery disease     HLD (hyperlipidemia)     HTN (hypertension)     Hypothyroidism     Obstructive sleep apnea on CPAP     Pneumonia     RLS (restless legs syndrome)       Past Surgical History:   Procedure Laterality Date    CARDIAC CATHETERIZATION  12/13/2007    Mild nonobstructive coronary artery disease. Normal left ventricular size and systolic function. EF 65%. No regional wall motion abnormalities. No MR    CARDIOVASCULAR STRESS TEST  05/07/2009    No evidence of Persantine induced ischemia, infarct, or scar. Normal wall motion and contractility. EF 68%.       Family History   Problem Relation Age of Onset    Arthritis Mother     Other Mother         pneumonia cause of death    Arthritis Father     Stroke Father      Social History     Tobacco Use    Smoking status: Former    Smokeless tobacco: Never   Substance Use Topics    Alcohol use: No      Current Outpatient Medications   Medication Sig Dispense Refill    buPROPion (WELLBUTRIN XL) 150 MG extended release tablet Take 150 mg by mouth every morning      bimatoprost (LUMIGAN) 0.03 % ophthalmic drops 1 drop nightly      cetirizine (ZYRTEC) 10 MG tablet Take 10 mg by mouth daily      QUEtiapine (SEROQUEL) 300 MG tablet Take 300 mg by mouth 2 times daily 50 MG in the morning 300 MG in the

## 2023-04-24 RX ORDER — CARVEDILOL 25 MG/1
TABLET ORAL
Qty: 180 TABLET | Refills: 3 | Status: SHIPPED | OUTPATIENT
Start: 2023-04-24

## 2023-10-03 RX ORDER — QUETIAPINE FUMARATE 100 MG/1
50 TABLET, FILM COATED ORAL EVERY MORNING
COMMUNITY

## 2023-10-03 RX ORDER — LEVOTHYROXINE SODIUM 0.12 MG/1
125 TABLET ORAL DAILY
COMMUNITY

## 2023-10-04 ENCOUNTER — ANESTHESIA EVENT (OUTPATIENT)
Dept: OPERATING ROOM | Age: 73
End: 2023-10-04
Payer: MEDICARE

## 2023-10-05 ENCOUNTER — ANESTHESIA (OUTPATIENT)
Dept: OPERATING ROOM | Age: 73
End: 2023-10-05
Payer: MEDICARE

## 2023-10-05 ENCOUNTER — HOSPITAL ENCOUNTER (OUTPATIENT)
Age: 73
Setting detail: OUTPATIENT SURGERY
Discharge: HOME | End: 2023-10-05
Attending: OPHTHALMOLOGY | Admitting: OPHTHALMOLOGY
Payer: MEDICARE

## 2023-10-05 VITALS
SYSTOLIC BLOOD PRESSURE: 135 MMHG | WEIGHT: 188.27 LBS | HEIGHT: 68 IN | RESPIRATION RATE: 18 BRPM | OXYGEN SATURATION: 96 % | BODY MASS INDEX: 28.53 KG/M2 | TEMPERATURE: 96.8 F | HEART RATE: 52 BPM | DIASTOLIC BLOOD PRESSURE: 72 MMHG

## 2023-10-05 PROBLEM — H35.341 MACULAR HOLE, RIGHT EYE: Chronic | Status: ACTIVE | Noted: 2023-10-05

## 2023-10-05 LAB
BUN BLD-MCNC: 15 MG/DL (ref 8–26)
EGFR, POC: >60 ML/MIN/1.73M2
GLUCOSE BLD-MCNC: 106 MG/DL (ref 74–100)
POC CREATININE: 0.8 MG/DL (ref 0.51–1.19)

## 2023-10-05 PROCEDURE — 2720000010 HC SURG SUPPLY STERILE: Performed by: OPHTHALMOLOGY

## 2023-10-05 PROCEDURE — 82947 ASSAY GLUCOSE BLOOD QUANT: CPT

## 2023-10-05 PROCEDURE — 6370000000 HC RX 637 (ALT 250 FOR IP): Performed by: OPHTHALMOLOGY

## 2023-10-05 PROCEDURE — 3600000014 HC SURGERY LEVEL 4 ADDTL 15MIN: Performed by: OPHTHALMOLOGY

## 2023-10-05 PROCEDURE — 2500000003 HC RX 250 WO HCPCS: Performed by: OPHTHALMOLOGY

## 2023-10-05 PROCEDURE — 2709999900 HC NON-CHARGEABLE SUPPLY: Performed by: OPHTHALMOLOGY

## 2023-10-05 PROCEDURE — 6360000002 HC RX W HCPCS: Performed by: NURSE ANESTHETIST, CERTIFIED REGISTERED

## 2023-10-05 PROCEDURE — 3700000000 HC ANESTHESIA ATTENDED CARE: Performed by: OPHTHALMOLOGY

## 2023-10-05 PROCEDURE — 82565 ASSAY OF CREATININE: CPT

## 2023-10-05 PROCEDURE — 84520 ASSAY OF UREA NITROGEN: CPT

## 2023-10-05 PROCEDURE — 2580000003 HC RX 258: Performed by: OPHTHALMOLOGY

## 2023-10-05 PROCEDURE — 7100000011 HC PHASE II RECOVERY - ADDTL 15 MIN: Performed by: OPHTHALMOLOGY

## 2023-10-05 PROCEDURE — 2500000003 HC RX 250 WO HCPCS: Performed by: NURSE ANESTHETIST, CERTIFIED REGISTERED

## 2023-10-05 PROCEDURE — 3600000004 HC SURGERY LEVEL 4 BASE: Performed by: OPHTHALMOLOGY

## 2023-10-05 PROCEDURE — 2580000003 HC RX 258: Performed by: NURSE ANESTHETIST, CERTIFIED REGISTERED

## 2023-10-05 PROCEDURE — 2580000003 HC RX 258: Performed by: ANESTHESIOLOGY

## 2023-10-05 PROCEDURE — 7100000010 HC PHASE II RECOVERY - FIRST 15 MIN: Performed by: OPHTHALMOLOGY

## 2023-10-05 PROCEDURE — 3700000001 HC ADD 15 MINUTES (ANESTHESIA): Performed by: OPHTHALMOLOGY

## 2023-10-05 PROCEDURE — 6360000002 HC RX W HCPCS: Performed by: OPHTHALMOLOGY

## 2023-10-05 RX ORDER — LABETALOL HYDROCHLORIDE 5 MG/ML
10 INJECTION, SOLUTION INTRAVENOUS
Status: DISCONTINUED | OUTPATIENT
Start: 2023-10-05 | End: 2023-10-05 | Stop reason: HOSPADM

## 2023-10-05 RX ORDER — SODIUM CHLORIDE, SODIUM LACTATE, POTASSIUM CHLORIDE, CALCIUM CHLORIDE 600; 310; 30; 20 MG/100ML; MG/100ML; MG/100ML; MG/100ML
INJECTION, SOLUTION INTRAVENOUS CONTINUOUS
Status: DISCONTINUED | OUTPATIENT
Start: 2023-10-05 | End: 2023-10-05 | Stop reason: HOSPADM

## 2023-10-05 RX ORDER — TOBRAMYCIN AND DEXAMETHASONE 3; 1 MG/ML; MG/ML
1 SUSPENSION/ DROPS OPHTHALMIC ONCE
Status: COMPLETED | OUTPATIENT
Start: 2023-10-05 | End: 2023-10-05

## 2023-10-05 RX ORDER — SODIUM CHLORIDE 9 MG/ML
INJECTION, SOLUTION INTRAVENOUS PRN
Status: DISCONTINUED | OUTPATIENT
Start: 2023-10-05 | End: 2023-10-05 | Stop reason: HOSPADM

## 2023-10-05 RX ORDER — SODIUM CHLORIDE 0.9 % (FLUSH) 0.9 %
5-40 SYRINGE (ML) INJECTION EVERY 12 HOURS SCHEDULED
Status: DISCONTINUED | OUTPATIENT
Start: 2023-10-05 | End: 2023-10-05 | Stop reason: HOSPADM

## 2023-10-05 RX ORDER — HYDRALAZINE HYDROCHLORIDE 20 MG/ML
10 INJECTION INTRAMUSCULAR; INTRAVENOUS
Status: DISCONTINUED | OUTPATIENT
Start: 2023-10-05 | End: 2023-10-05 | Stop reason: HOSPADM

## 2023-10-05 RX ORDER — SODIUM CHLORIDE 0.9 % (FLUSH) 0.9 %
5-40 SYRINGE (ML) INJECTION PRN
Status: DISCONTINUED | OUTPATIENT
Start: 2023-10-05 | End: 2023-10-05 | Stop reason: HOSPADM

## 2023-10-05 RX ORDER — PROPOFOL 10 MG/ML
INJECTION, EMULSION INTRAVENOUS PRN
Status: DISCONTINUED | OUTPATIENT
Start: 2023-10-05 | End: 2023-10-05 | Stop reason: SDUPTHER

## 2023-10-05 RX ORDER — WATER 1000 ML/1000ML
INJECTION, SOLUTION INTRAVENOUS PRN
Status: DISCONTINUED | OUTPATIENT
Start: 2023-10-05 | End: 2023-10-05 | Stop reason: ALTCHOICE

## 2023-10-05 RX ORDER — TROPICAMIDE 10 MG/ML
1 SOLUTION/ DROPS OPHTHALMIC EVERY 5 MIN PRN
Status: COMPLETED | OUTPATIENT
Start: 2023-10-05 | End: 2023-10-05

## 2023-10-05 RX ORDER — LIDOCAINE HYDROCHLORIDE 10 MG/ML
INJECTION, SOLUTION EPIDURAL; INFILTRATION; INTRACAUDAL; PERINEURAL PRN
Status: DISCONTINUED | OUTPATIENT
Start: 2023-10-05 | End: 2023-10-05 | Stop reason: SDUPTHER

## 2023-10-05 RX ORDER — BALANCED SALT SOLUTION ENRICHED WITH BICARBONATE, DEXTROSE, AND GLUTATHIONE
KIT INTRAOCULAR PRN
Status: DISCONTINUED | OUTPATIENT
Start: 2023-10-05 | End: 2023-10-05 | Stop reason: ALTCHOICE

## 2023-10-05 RX ORDER — SODIUM CHLORIDE, SODIUM LACTATE, POTASSIUM CHLORIDE, CALCIUM CHLORIDE 600; 310; 30; 20 MG/100ML; MG/100ML; MG/100ML; MG/100ML
INJECTION, SOLUTION INTRAVENOUS CONTINUOUS PRN
Status: DISCONTINUED | OUTPATIENT
Start: 2023-10-05 | End: 2023-10-05 | Stop reason: SDUPTHER

## 2023-10-05 RX ORDER — TROPICAMIDE 10 MG/ML
SOLUTION/ DROPS OPHTHALMIC PRN
Status: DISCONTINUED | OUTPATIENT
Start: 2023-10-05 | End: 2023-10-05 | Stop reason: ALTCHOICE

## 2023-10-05 RX ORDER — ERYTHROMYCIN 5 MG/G
OINTMENT OPHTHALMIC PRN
Status: DISCONTINUED | OUTPATIENT
Start: 2023-10-05 | End: 2023-10-05 | Stop reason: ALTCHOICE

## 2023-10-05 RX ORDER — PHENYLEPHRINE HYDROCHLORIDE 100 MG/ML
1 SOLUTION/ DROPS OPHTHALMIC EVERY 5 MIN PRN
Status: COMPLETED | OUTPATIENT
Start: 2023-10-05 | End: 2023-10-05

## 2023-10-05 RX ORDER — CEFTAZIDIME 1 G/1
INJECTION, POWDER, FOR SOLUTION INTRAMUSCULAR; INTRAVENOUS PRN
Status: DISCONTINUED | OUTPATIENT
Start: 2023-10-05 | End: 2023-10-05 | Stop reason: ALTCHOICE

## 2023-10-05 RX ORDER — ONDANSETRON 2 MG/ML
4 INJECTION INTRAMUSCULAR; INTRAVENOUS
Status: DISCONTINUED | OUTPATIENT
Start: 2023-10-05 | End: 2023-10-05 | Stop reason: HOSPADM

## 2023-10-05 RX ADMIN — TROPICAMIDE 1 DROP: 10 SOLUTION/ DROPS OPHTHALMIC at 09:05

## 2023-10-05 RX ADMIN — SODIUM CHLORIDE, POTASSIUM CHLORIDE, SODIUM LACTATE AND CALCIUM CHLORIDE: 600; 310; 30; 20 INJECTION, SOLUTION INTRAVENOUS at 09:32

## 2023-10-05 RX ADMIN — PROPOFOL 75 MG: 10 INJECTION, EMULSION INTRAVENOUS at 09:45

## 2023-10-05 RX ADMIN — TROPICAMIDE 1 DROP: 10 SOLUTION/ DROPS OPHTHALMIC at 09:00

## 2023-10-05 RX ADMIN — TOBRAMYCIN AND DEXAMETHASONE 1 DROP: 3; 1 SUSPENSION/ DROPS OPHTHALMIC at 08:55

## 2023-10-05 RX ADMIN — PHENYLEPHRINE HYDROCHLORIDE 1 DROP: 100 SOLUTION/ DROPS OPHTHALMIC at 09:05

## 2023-10-05 RX ADMIN — LIDOCAINE HYDROCHLORIDE 50 MG: 10 INJECTION, SOLUTION EPIDURAL; INFILTRATION; INTRACAUDAL; PERINEURAL at 09:45

## 2023-10-05 RX ADMIN — PHENYLEPHRINE HYDROCHLORIDE 1 DROP: 100 SOLUTION/ DROPS OPHTHALMIC at 09:00

## 2023-10-05 RX ADMIN — SODIUM CHLORIDE, POTASSIUM CHLORIDE, SODIUM LACTATE AND CALCIUM CHLORIDE: 600; 310; 30; 20 INJECTION, SOLUTION INTRAVENOUS at 09:03

## 2023-10-05 RX ADMIN — TROPICAMIDE 1 DROP: 10 SOLUTION/ DROPS OPHTHALMIC at 08:55

## 2023-10-05 RX ADMIN — PHENYLEPHRINE HYDROCHLORIDE 1 DROP: 100 SOLUTION/ DROPS OPHTHALMIC at 08:55

## 2023-10-05 ASSESSMENT — PAIN - FUNCTIONAL ASSESSMENT: PAIN_FUNCTIONAL_ASSESSMENT: 0-10

## 2023-10-05 NOTE — OP NOTE
Operative Note      Patient: Sabrina Moon  YOB: 1950  MRN: [id number]    Date of Procedure: 10/5/2023    Sabrina Moon   10/5/2023      10:42 AM  1950  [id number]      PREOP Diagnosis: Macular Hole, Right Eye    POSTOP Diagnosis:  Same    Procedure:  Vitrectomy,  membrane stripping, removal of internal limiting membrane,  Brilliant Blue, Right eye    Anesthesia:  MAC    Surgeon:  Harini Simms MD    EBL:  Minimal    Fluids:  See anesthesia record    Drains:  None    Specimen:  None    Complications:  None    Reason for operation:   The patient has significant vision loss that is interfering with daily activities. Patient wishes to have surgery to remove macular pucker in anticipation of better vision. Patient understands vision is not likely to return fully to normal and it will take 3-6 months to obtain best vision. Patient understands risks of surgery include, but are not limited to, bleeding, infection and retinal detachment. Cataract development is accelerated, usually resulting in cataract surgery in 6-24 months      Procedure: The patient was brought to the operating room in good condition. Transient Propofol was given. Retrobulbar and topical anaesthesia were administered. The patient was prepped and draped in the usual manner. 25 gauge vitrectomy trocars were inserted in the usual quadrants. Infusion was inserted in the inferior temporal quadrant. Light pipe and ocutome placed through the superior trocars. Vitreous was removed from anterior to posterior and trimmed out past the equator in all quadrants. Blue was placed over the posterior pole and allowed to remain in place for 30 seconds. It was removed with silicone tipped extrusion. Intraocular forceps were used to peel ILM and ERM from the central 2/3 of the posterior pole. No significant bleeding occurred at any time. An air-fluid exchange was done. 16% SF6 was exchanged for air.  No tears or detachment were found with scleral

## 2023-10-05 NOTE — H&P
History and Physical    Pt Name: Shanell Koehler  MRN: [id number]  YOB: 1950  Date of evaluation: 10/5/2023  Primary Care Physician: Sundeep Aviles MD    SUBJECTIVE:   History of Chief Complaint:    Shanell Koehler is a 74 y.o. male who is scheduled today for VITRECTOMY 25 GAUGE, GAS FLUID EXCHANGE, BRILLIANT BLUE - Right. He is present with his wife Tj today. Patient complains of a one and a half month history of wavy blurry vision on the right. He denies prior eye procedures. Patient and wife report he has not been taking his aspirin for the past several months per his gastroenterologist due to gastritis issues. Allergies  is allergic to sulfa antibiotics. Medications  Prior to Admission medications    Medication Sig Start Date End Date Taking? Authorizing Provider   sertraline (ZOLOFT) 50 MG tablet Take 1 tablet by mouth daily   Yes Katia Mcmahon MD   QUEtiapine (SEROQUEL) 100 MG tablet Take 0.5 tablets by mouth every morning Pt.  also takes 300 mg QHS   Yes Katia Mcmahon MD   Coenzyme Q10 (CO Q 10) 100 MG CAPS Take 100 mg by mouth daily   Yes Katia Mcmahon MD   levothyroxine (SYNTHROID) 125 MCG tablet Take 1 tablet by mouth Daily   Yes Katia Mcmahon MD   carvedilol (COREG) 25 MG tablet TAKE 1 TABLET TWICE A DAY 4/24/23   Cassandra Kimble MD   buPROPion (WELLBUTRIN XL) 150 MG extended release tablet Take 1 tablet by mouth every morning    Katia Mcmahon MD   bimatoprost (LUMIGAN) 0.03 % ophthalmic drops Place 1 drop into both eyes nightly    Katia Mcmahon MD   cetirizine (ZYRTEC) 10 MG tablet Take 1 tablet by mouth daily    Katia Mcmahon MD   QUEtiapine (SEROQUEL) 300 MG tablet Take 1 tablet by mouth 2 times daily 50 MG in the morning 300 MG in the evening  Patient not taking: Reported on 10/3/2023    Katia Mcmahon MD   donepezil (ARICEPT) 5 MG tablet Take 2 tablets by mouth nightly    Katia Mcmahon MD   simvastatin MOUTH/THROAT:  Mucous membranes moist, tongue is pink, uvula midline, teeth appear to be intact  NECK:Full ROM  LUNGS: Respirations even and non-labored. Clear to auscultation bilaterally, no wheezes, rales, or rhonchi. CARDIOVASCULAR: Regular rate and rhythm, no murmurs/rubs/gallops   ABDOMEN: soft, non-tender, non-distended, bowel sounds active x 4   EXTREMITIES: No edema bilateral lower extremities. No varicosities bilateral lower extremities. NEUROLOGIC: CN II-XII are grossly intact. Gait not assessed.   IMPRESSIONS:   Macular hole, right eye    PLANS:   VITRECTOMY 25 GAUGE, GAS FLUID EXCHANGE, BRILLIANT BLUE - Right      LAURENCE MARAVILLA - CNP   Electronically signed 10/5/2023 at 8:52 AM

## 2023-10-05 NOTE — PROGRESS NOTES
Discharge instructions discuss with wife and patient, verbalizes understanding, All belongings and eye kit care given to patient and wife. Discharged per wheelchair accomapnied by JESI Brady with stable vital signs,comfortable and denies pain.

## 2024-04-03 ENCOUNTER — OFFICE VISIT (OUTPATIENT)
Dept: CARDIOLOGY CLINIC | Age: 74
End: 2024-04-03
Payer: MEDICARE

## 2024-04-03 VITALS
HEIGHT: 68 IN | DIASTOLIC BLOOD PRESSURE: 72 MMHG | BODY MASS INDEX: 29.1 KG/M2 | SYSTOLIC BLOOD PRESSURE: 132 MMHG | WEIGHT: 192 LBS | HEART RATE: 48 BPM

## 2024-04-03 DIAGNOSIS — E78.01 FAMILIAL HYPERCHOLESTEROLEMIA: ICD-10-CM

## 2024-04-03 DIAGNOSIS — I25.10 ASHD (ARTERIOSCLEROTIC HEART DISEASE): ICD-10-CM

## 2024-04-03 DIAGNOSIS — I10 PRIMARY HYPERTENSION: Primary | ICD-10-CM

## 2024-04-03 PROCEDURE — 3078F DIAST BP <80 MM HG: CPT | Performed by: NUCLEAR MEDICINE

## 2024-04-03 PROCEDURE — 93000 ELECTROCARDIOGRAM COMPLETE: CPT | Performed by: NUCLEAR MEDICINE

## 2024-04-03 PROCEDURE — 99213 OFFICE O/P EST LOW 20 MIN: CPT | Performed by: NUCLEAR MEDICINE

## 2024-04-03 PROCEDURE — 1123F ACP DISCUSS/DSCN MKR DOCD: CPT | Performed by: NUCLEAR MEDICINE

## 2024-04-03 PROCEDURE — 3075F SYST BP GE 130 - 139MM HG: CPT | Performed by: NUCLEAR MEDICINE

## 2024-04-03 RX ORDER — CARVEDILOL 25 MG/1
25 TABLET ORAL DAILY
Qty: 90 TABLET | Refills: 3 | Status: SHIPPED | OUTPATIENT
Start: 2024-04-03 | End: 2024-04-04 | Stop reason: SDUPTHER

## 2024-04-03 RX ORDER — SIMVASTATIN 20 MG
20 TABLET ORAL NIGHTLY
Qty: 90 TABLET | Refills: 3 | Status: SHIPPED | OUTPATIENT
Start: 2024-04-03

## 2024-04-03 NOTE — PROGRESS NOTES
Take 1 tablet by mouth daily      vitamin B-12 (CYANOCOBALAMIN) 1000 MCG tablet Take 2 tablets by mouth daily       No current facility-administered medications for this visit.     Allergies   Allergen Reactions    Sulfa Antibiotics Other (See Comments)     Dizzy and chest tightness     Health Maintenance   Topic Date Due    Depression Monitoring  Never done    Hepatitis C screen  Never done    Colorectal Cancer Screen  Never done    Respiratory Syncytial Virus (RSV) Pregnant or age 60 yrs+ (1 - 1-dose 60+ series) Never done    AAA screen  Never done    DTaP/Tdap/Td vaccine (2 - Tdap) 07/11/2018    Lipids  02/26/2023    COVID-19 Vaccine (3 - 2023-24 season) 09/01/2023    Annual Wellness Visit (Medicare Advantage)  Never done    Flu vaccine  Completed    Shingles vaccine  Completed    Pneumococcal 65+ years Vaccine  Completed    Hepatitis A vaccine  Aged Out    Hepatitis B vaccine  Aged Out    Hib vaccine  Aged Out    Polio vaccine  Aged Out    Meningococcal (ACWY) vaccine  Aged Out       Subjective:  General:   No fever, no chills, No fatigue or weight loss  Pulmonary:    No dyspnea, no wheezing  Cardiac:    Denies recent chest pain,   GI:     No nausea or vomiting, no abdominal pain  Neuro:    No dizziness or light headedness,   Musculoskeletal:  No recent active issues  Extremities:   No edema, no obvious claudication       Objective:  General:   Well developed, well nourished  Lungs:   Clear to auscultation  Heart:    Normal S1 S2, Slight murmur. no rubs, no gallops  Abdomen:   Soft, non tender, no organomegalies, positive bowel sounds  Extremities:   No edema, no cyanosis, good peripheral pulses  Neurological:   Awake, alert, oriented. No obvious focal deficits  Musculoskelatal:  No obvious deformities   /72   Pulse (!) 48   Ht 1.727 m (5' 8\")   Wt 87.1 kg (192 lb)   BMI 29.19 kg/m²     Assessment:      Diagnosis Orders   1. Primary hypertension  EKG 12 lead      2. ASHD (arteriosclerotic heart

## 2024-04-04 RX ORDER — CARVEDILOL 25 MG/1
25 TABLET ORAL 2 TIMES DAILY
Qty: 180 TABLET | Refills: 3 | Status: SHIPPED | OUTPATIENT
Start: 2024-04-04

## 2025-04-02 ENCOUNTER — OFFICE VISIT (OUTPATIENT)
Dept: CARDIOLOGY CLINIC | Age: 75
End: 2025-04-02
Payer: MEDICARE

## 2025-04-02 VITALS
BODY MASS INDEX: 29.07 KG/M2 | WEIGHT: 191.8 LBS | HEIGHT: 68 IN | HEART RATE: 51 BPM | SYSTOLIC BLOOD PRESSURE: 112 MMHG | DIASTOLIC BLOOD PRESSURE: 62 MMHG

## 2025-04-02 DIAGNOSIS — I10 PRIMARY HYPERTENSION: Primary | ICD-10-CM

## 2025-04-02 DIAGNOSIS — I25.10 ASHD (ARTERIOSCLEROTIC HEART DISEASE): ICD-10-CM

## 2025-04-02 DIAGNOSIS — E78.01 FAMILIAL HYPERCHOLESTEROLEMIA: ICD-10-CM

## 2025-04-02 PROCEDURE — 93000 ELECTROCARDIOGRAM COMPLETE: CPT | Performed by: NUCLEAR MEDICINE

## 2025-04-02 PROCEDURE — 99213 OFFICE O/P EST LOW 20 MIN: CPT | Performed by: NUCLEAR MEDICINE

## 2025-04-02 PROCEDURE — 3074F SYST BP LT 130 MM HG: CPT | Performed by: NUCLEAR MEDICINE

## 2025-04-02 PROCEDURE — 1036F TOBACCO NON-USER: CPT | Performed by: NUCLEAR MEDICINE

## 2025-04-02 PROCEDURE — G8427 DOCREV CUR MEDS BY ELIG CLIN: HCPCS | Performed by: NUCLEAR MEDICINE

## 2025-04-02 PROCEDURE — 1123F ACP DISCUSS/DSCN MKR DOCD: CPT | Performed by: NUCLEAR MEDICINE

## 2025-04-02 PROCEDURE — G8419 CALC BMI OUT NRM PARAM NOF/U: HCPCS | Performed by: NUCLEAR MEDICINE

## 2025-04-02 PROCEDURE — 3078F DIAST BP <80 MM HG: CPT | Performed by: NUCLEAR MEDICINE

## 2025-04-02 PROCEDURE — 1159F MED LIST DOCD IN RCRD: CPT | Performed by: NUCLEAR MEDICINE

## 2025-04-02 NOTE — PROGRESS NOTES
[facility] [place] [facility]  [facility] CARDIOLOGY  [address]  [address]  [facility] [address]  Dept: [phone]  Dept Fax: [phone]  Loc: [phone]    Visit Date: 4/2/2025    Daniel Whartno is a 74 y.o. male who presents todayfor:  Chief Complaint   Patient presents with    Follow-up    Hypertension    Coronary Artery Disease    Hyperlipidemia     Known mild CAD  No chest pain  No changes in breathing  No changes clinically   BP is stable  No dizziness  No syncope  On statins for hyperlipidemia      HPI:  HPI  Past Medical History:   Diagnosis Date    Abdominal hernia     Anxiety     ASHD (arteriosclerotic heart disease)     Dr. Bee/ [place]/ last seen 3-2023    Dementia (HCC) 11/2022    mild-mod    Depression     GERD (gastroesophageal reflux disease)     Glaucoma     History of chest pain     History of coronary artery disease     History of kidney stones     HLD (hyperlipidemia)     [place] (hard of hearing)     bilat. hearing aides    HTN (hypertension)     Hypothyroidism     Macular hole of right eye     Obstructive sleep apnea on CPAP     Pneumonia     RLS (restless legs syndrome)     Seasonal allergies     Snores     Vitamin B deficiency     Wears glasses     Wellness examination     PCP Eron Tillman MD/ [place]/ last seen 8-1-23      Past Surgical History:   Procedure Laterality Date    BACK SURGERY      cyst removal    CARDIOVASCULAR STRESS TEST  05/07/2009    No evidence of Persantine induced ischemia, infarct, or scar. Normal wall motion and contractility. EF 68%.     COLONOSCOPY      EYE SURGERY Right 10/05/2023    VITRECTOMY 25 GAUGE, AIR FLUID EXCHANGE, AIR GAS EXCHANGE WITH 16% OF SF6, BRILLIANT BLUE    KIDNEY STONE SURGERY      NECK SURGERY      spur removal    VITRECTOMY Right 10/5/2023    VITRECTOMY 25 GAUGE, AIR FLUID EXCHANGE, AIR GAS EXCHANGE WITH 16% OF SF6, BRILLIANT BLUE performed by Jensen Hernandez MD at [facility]     Family History   Problem

## 2025-07-08 ENCOUNTER — OFFICE VISIT (OUTPATIENT)
Dept: PSYCHIATRY | Age: 75
End: 2025-07-08
Payer: MEDICARE

## 2025-07-08 VITALS — HEART RATE: 63 BPM | DIASTOLIC BLOOD PRESSURE: 67 MMHG | SYSTOLIC BLOOD PRESSURE: 117 MMHG

## 2025-07-08 DIAGNOSIS — G47.00 INSOMNIA, UNSPECIFIED TYPE: ICD-10-CM

## 2025-07-08 DIAGNOSIS — F33.41 RECURRENT MAJOR DEPRESSIVE DISORDER, IN PARTIAL REMISSION: ICD-10-CM

## 2025-07-08 DIAGNOSIS — F41.1 GAD (GENERALIZED ANXIETY DISORDER): ICD-10-CM

## 2025-07-08 DIAGNOSIS — G30.9 MAJOR NEUROCOGNITIVE DISORDER DUE TO ALZHEIMER'S DISEASE, WITHOUT BEHAVIORAL DISTURBANCE (HCC): Primary | ICD-10-CM

## 2025-07-08 DIAGNOSIS — F02.80 MAJOR NEUROCOGNITIVE DISORDER DUE TO ALZHEIMER'S DISEASE, WITHOUT BEHAVIORAL DISTURBANCE (HCC): Primary | ICD-10-CM

## 2025-07-08 PROCEDURE — 90792 PSYCH DIAG EVAL W/MED SRVCS: CPT | Performed by: PSYCHIATRY & NEUROLOGY

## 2025-07-08 PROCEDURE — 1036F TOBACCO NON-USER: CPT | Performed by: PSYCHIATRY & NEUROLOGY

## 2025-07-08 RX ORDER — BUPROPION HYDROCHLORIDE 300 MG/1
300 TABLET ORAL DAILY
COMMUNITY
Start: 2025-05-08

## 2025-07-08 RX ORDER — QUETIAPINE FUMARATE 50 MG/1
50 TABLET, FILM COATED ORAL EVERY MORNING
COMMUNITY
Start: 2025-05-10

## 2025-07-08 RX ORDER — ALPRAZOLAM 0.5 MG
0.25 TABLET ORAL NIGHTLY
COMMUNITY
Start: 2025-06-22

## 2025-07-08 RX ORDER — SIMVASTATIN 40 MG
40 TABLET ORAL DAILY
COMMUNITY
Start: 2025-05-08

## 2025-07-08 RX ORDER — QUETIAPINE FUMARATE 200 MG/1
200 TABLET, FILM COATED ORAL NIGHTLY
COMMUNITY
Start: 2025-06-22

## 2025-07-08 RX ORDER — SERTRALINE HYDROCHLORIDE 100 MG/1
200 TABLET, FILM COATED ORAL DAILY
COMMUNITY
Start: 2025-06-19

## 2025-07-08 ASSESSMENT — ENCOUNTER SYMPTOMS
GASTROINTESTINAL NEGATIVE: 1
RESPIRATORY NEGATIVE: 1
EYES NEGATIVE: 1
ALLERGIC/IMMUNOLOGIC NEGATIVE: 1

## 2025-07-08 ASSESSMENT — PATIENT HEALTH QUESTIONNAIRE - PHQ9: DEPRESSION UNABLE TO ASSESS: FUNCTIONAL CAPACITY MOTIVATION LIMITS ACCURACY

## 2025-07-08 NOTE — PROGRESS NOTES
[facility] [place] [facility]  [facility] PSYCHIATRY  [address] [address]  [phone]    Initial Psychiatric Eval    Patient:  Daniel Wharton  YOB: 1950  PCP:  Eron Tillman MD    Visit Date:  7/8/2025    CC: \"medication management\" per wife    History of Present Illness  The patient is a 75-year-old male referred by his primary care physician for anxiety and depression in the context of dementia. He is accompanied by his wife. Majority of information obtained from wife.    He reports feeling generally well but experiences memory issues and excessive worry. He describes his brain as feeling \"like it's over there\" and mentions difficulty with memory and excessive worry about things he shouldn't worry about. He also reports a peculiar sensation of something \"the size of a large pea\" stuck on his tongue, which causes discomfort and a feeling of imbalance. He has a history of anxiety and obsessive-compulsive disorder, which have lessened over time. His wife describes him as a gentle, kind person who is easy to get along with, although he occasionally exhibits minor irritability. He has not required psychiatric hospitalization and has no history of suicide attempts or self-harm. He was previously under the care of Dr. Liu, a psychiatrist, but discontinued treatment due to perceived lack of improvement. He is currently on sertraline, quetiapine, and alprazolam, which he takes at bedtime. He has been on this regimen for about a year.    He was diagnosed with dementia by a neurologist, following an Alzheimer's diagnosis from another doctor. His anxiety and depression predate his Alzheimer's diagnosis. He was referred to neurology to rule out any major brain disorders, which led to a diagnosis of cortical dementia. He has been experiencing night sweats for about a year, which occur at least five nights a week. Attempts to reduce his Zoloft dosage

## 2025-07-08 NOTE — PATIENT INSTRUCTIONS
Plan:  Daniel Wharton, it was nice seeing you today  Decrease alprazolam to 0.25mg nightly (1/2 tablet). Monitor for worsening symptoms  Continue Zoloft 200mg daily  Continue Seroquel 50mg in morning and 200mg nightly  Continue Wellbutrin XL 300mg daily  Continue to see neurology  Follow up in 4-6 weeks      -Please take medications as prescribed  -Refrain from alcohol or drug use  -Seek emergency help via the emergency and/or calling 911 should symptoms become severe, worsen, or with other concerning symptoms.Go immediately to the emergency room and/or call 911 with any suicidal or homicidal ideations or if audio/visual hallucinations develop.   -Contact office with any questions or concerns.      Crisis phone numbers:  988-national suicide hotline, call or text  [facility] [place] [phone].  [facility] [phone]  [facility] [phone].  [facility] [phone].  [facility] [phone].  [facility] [phone].

## 2025-08-05 ENCOUNTER — OFFICE VISIT (OUTPATIENT)
Dept: PSYCHIATRY | Age: 75
End: 2025-08-05
Payer: MEDICARE

## 2025-08-05 VITALS — DIASTOLIC BLOOD PRESSURE: 62 MMHG | HEART RATE: 49 BPM | SYSTOLIC BLOOD PRESSURE: 116 MMHG

## 2025-08-05 DIAGNOSIS — F33.41 RECURRENT MAJOR DEPRESSIVE DISORDER, IN PARTIAL REMISSION: ICD-10-CM

## 2025-08-05 DIAGNOSIS — F02.80 MAJOR NEUROCOGNITIVE DISORDER DUE TO ALZHEIMER'S DISEASE, WITHOUT BEHAVIORAL DISTURBANCE (HCC): Primary | ICD-10-CM

## 2025-08-05 DIAGNOSIS — G30.9 MAJOR NEUROCOGNITIVE DISORDER DUE TO ALZHEIMER'S DISEASE, WITHOUT BEHAVIORAL DISTURBANCE (HCC): Primary | ICD-10-CM

## 2025-08-05 DIAGNOSIS — F41.1 GAD (GENERALIZED ANXIETY DISORDER): ICD-10-CM

## 2025-08-05 DIAGNOSIS — G47.00 INSOMNIA, UNSPECIFIED TYPE: ICD-10-CM

## 2025-08-05 PROCEDURE — 99214 OFFICE O/P EST MOD 30 MIN: CPT | Performed by: PSYCHIATRY & NEUROLOGY

## 2025-08-05 PROCEDURE — 1159F MED LIST DOCD IN RCRD: CPT | Performed by: PSYCHIATRY & NEUROLOGY

## 2025-08-05 PROCEDURE — 3078F DIAST BP <80 MM HG: CPT | Performed by: PSYCHIATRY & NEUROLOGY

## 2025-08-05 PROCEDURE — 1036F TOBACCO NON-USER: CPT | Performed by: PSYCHIATRY & NEUROLOGY

## 2025-08-05 PROCEDURE — 3074F SYST BP LT 130 MM HG: CPT | Performed by: PSYCHIATRY & NEUROLOGY

## 2025-08-05 PROCEDURE — G8427 DOCREV CUR MEDS BY ELIG CLIN: HCPCS | Performed by: PSYCHIATRY & NEUROLOGY

## 2025-08-05 PROCEDURE — 1160F RVW MEDS BY RX/DR IN RCRD: CPT | Performed by: PSYCHIATRY & NEUROLOGY

## 2025-08-05 PROCEDURE — 90833 PSYTX W PT W E/M 30 MIN: CPT | Performed by: PSYCHIATRY & NEUROLOGY

## 2025-08-05 PROCEDURE — 1123F ACP DISCUSS/DSCN MKR DOCD: CPT | Performed by: PSYCHIATRY & NEUROLOGY

## 2025-08-05 PROCEDURE — G8419 CALC BMI OUT NRM PARAM NOF/U: HCPCS | Performed by: PSYCHIATRY & NEUROLOGY

## 2025-08-05 RX ORDER — QUETIAPINE FUMARATE 25 MG/1
25 TABLET, FILM COATED ORAL EVERY MORNING
Qty: 30 TABLET | Refills: 1 | Status: SHIPPED | OUTPATIENT
Start: 2025-08-05

## 2025-08-05 RX ORDER — DONEPEZIL HYDROCHLORIDE 10 MG/1
10 TABLET, FILM COATED ORAL NIGHTLY
COMMUNITY
Start: 2025-07-22

## 2025-08-05 ASSESSMENT — PATIENT HEALTH QUESTIONNAIRE - PHQ9
5. POOR APPETITE OR OVEREATING: NOT AT ALL
7. TROUBLE CONCENTRATING ON THINGS, SUCH AS READING THE NEWSPAPER OR WATCHING TELEVISION: SEVERAL DAYS
2. FEELING DOWN, DEPRESSED OR HOPELESS: NOT AT ALL
3. TROUBLE FALLING OR STAYING ASLEEP: NOT AT ALL
4. FEELING TIRED OR HAVING LITTLE ENERGY: NOT AT ALL
8. MOVING OR SPEAKING SO SLOWLY THAT OTHER PEOPLE COULD HAVE NOTICED. OR THE OPPOSITE, BEING SO FIGETY OR RESTLESS THAT YOU HAVE BEEN MOVING AROUND A LOT MORE THAN USUAL: NOT AT ALL
6. FEELING BAD ABOUT YOURSELF - OR THAT YOU ARE A FAILURE OR HAVE LET YOURSELF OR YOUR FAMILY DOWN: SEVERAL DAYS
9. THOUGHTS THAT YOU WOULD BE BETTER OFF DEAD, OR OF HURTING YOURSELF: NOT AT ALL
10. IF YOU CHECKED OFF ANY PROBLEMS, HOW DIFFICULT HAVE THESE PROBLEMS MADE IT FOR YOU TO DO YOUR WORK, TAKE CARE OF THINGS AT HOME, OR GET ALONG WITH OTHER PEOPLE: NOT DIFFICULT AT ALL
SUM OF ALL RESPONSES TO PHQ QUESTIONS 1-9: 2

## 2025-08-06 ENCOUNTER — TELEPHONE (OUTPATIENT)
Dept: PSYCHIATRY | Age: 75
End: 2025-08-06

## 2025-09-02 ENCOUNTER — OFFICE VISIT (OUTPATIENT)
Dept: PSYCHIATRY | Age: 75
End: 2025-09-02
Payer: MEDICARE

## 2025-09-02 VITALS — DIASTOLIC BLOOD PRESSURE: 65 MMHG | SYSTOLIC BLOOD PRESSURE: 116 MMHG | HEART RATE: 49 BPM

## 2025-09-02 DIAGNOSIS — F41.1 GAD (GENERALIZED ANXIETY DISORDER): ICD-10-CM

## 2025-09-02 DIAGNOSIS — F02.80 MAJOR NEUROCOGNITIVE DISORDER DUE TO ALZHEIMER'S DISEASE, WITHOUT BEHAVIORAL DISTURBANCE (HCC): Primary | ICD-10-CM

## 2025-09-02 DIAGNOSIS — F33.41 RECURRENT MAJOR DEPRESSIVE DISORDER, IN PARTIAL REMISSION: ICD-10-CM

## 2025-09-02 DIAGNOSIS — G30.9 MAJOR NEUROCOGNITIVE DISORDER DUE TO ALZHEIMER'S DISEASE, WITHOUT BEHAVIORAL DISTURBANCE (HCC): Primary | ICD-10-CM

## 2025-09-02 PROCEDURE — 1123F ACP DISCUSS/DSCN MKR DOCD: CPT | Performed by: PSYCHIATRY & NEUROLOGY

## 2025-09-02 PROCEDURE — 90833 PSYTX W PT W E/M 30 MIN: CPT | Performed by: PSYCHIATRY & NEUROLOGY

## 2025-09-02 PROCEDURE — 1160F RVW MEDS BY RX/DR IN RCRD: CPT | Performed by: PSYCHIATRY & NEUROLOGY

## 2025-09-02 PROCEDURE — G8419 CALC BMI OUT NRM PARAM NOF/U: HCPCS | Performed by: PSYCHIATRY & NEUROLOGY

## 2025-09-02 PROCEDURE — G8427 DOCREV CUR MEDS BY ELIG CLIN: HCPCS | Performed by: PSYCHIATRY & NEUROLOGY

## 2025-09-02 PROCEDURE — 99214 OFFICE O/P EST MOD 30 MIN: CPT | Performed by: PSYCHIATRY & NEUROLOGY

## 2025-09-02 PROCEDURE — 1036F TOBACCO NON-USER: CPT | Performed by: PSYCHIATRY & NEUROLOGY

## 2025-09-02 PROCEDURE — 1159F MED LIST DOCD IN RCRD: CPT | Performed by: PSYCHIATRY & NEUROLOGY

## 2025-09-02 PROCEDURE — 3078F DIAST BP <80 MM HG: CPT | Performed by: PSYCHIATRY & NEUROLOGY

## 2025-09-02 PROCEDURE — 3074F SYST BP LT 130 MM HG: CPT | Performed by: PSYCHIATRY & NEUROLOGY

## 2025-09-02 RX ORDER — SERTRALINE HYDROCHLORIDE 100 MG/1
200 TABLET, FILM COATED ORAL DAILY
Qty: 90 TABLET | Refills: 1 | Status: SHIPPED | OUTPATIENT
Start: 2025-09-02

## 2025-09-02 RX ORDER — MEMANTINE HYDROCHLORIDE 5 MG/1
5 TABLET ORAL DAILY
Qty: 30 TABLET | Refills: 2 | Status: SHIPPED | OUTPATIENT
Start: 2025-09-02

## 2025-09-02 RX ORDER — QUETIAPINE FUMARATE 200 MG/1
200 TABLET, FILM COATED ORAL NIGHTLY
Qty: 90 TABLET | Refills: 1 | Status: SHIPPED | OUTPATIENT
Start: 2025-09-02

## 2025-09-02 ASSESSMENT — PATIENT HEALTH QUESTIONNAIRE - PHQ9
SUM OF ALL RESPONSES TO PHQ QUESTIONS 1-9: 3
SUM OF ALL RESPONSES TO PHQ QUESTIONS 1-9: 3
8. MOVING OR SPEAKING SO SLOWLY THAT OTHER PEOPLE COULD HAVE NOTICED. OR THE OPPOSITE, BEING SO FIGETY OR RESTLESS THAT YOU HAVE BEEN MOVING AROUND A LOT MORE THAN USUAL: NOT AT ALL
7. TROUBLE CONCENTRATING ON THINGS, SUCH AS READING THE NEWSPAPER OR WATCHING TELEVISION: SEVERAL DAYS
2. FEELING DOWN, DEPRESSED OR HOPELESS: NOT AT ALL
9. THOUGHTS THAT YOU WOULD BE BETTER OFF DEAD, OR OF HURTING YOURSELF: NOT AT ALL
1. LITTLE INTEREST OR PLEASURE IN DOING THINGS: NOT AT ALL
5. POOR APPETITE OR OVEREATING: NOT AT ALL
6. FEELING BAD ABOUT YOURSELF - OR THAT YOU ARE A FAILURE OR HAVE LET YOURSELF OR YOUR FAMILY DOWN: NOT AT ALL
SUM OF ALL RESPONSES TO PHQ QUESTIONS 1-9: 3
4. FEELING TIRED OR HAVING LITTLE ENERGY: SEVERAL DAYS
3. TROUBLE FALLING OR STAYING ASLEEP: SEVERAL DAYS
SUM OF ALL RESPONSES TO PHQ QUESTIONS 1-9: 3

## (undated) DEVICE — DRAPE,REIN 53X77,STERILE: Brand: MEDLINE

## (undated) DEVICE — 1 EACH 40411 STERILE DISPOSABLE SUPER VIEW® LENS SET & 1 EACH 40100 STERILE MICROSCOPE DRAPE: Brand: SUPER VIEW® PACK

## (undated) DEVICE — 25GA EZPASS SOFT TIP CANNULA BOX OF 5: Brand: VORTEX SURGICAL INC

## (undated) DEVICE — GLOVE SURG SZ 65 CRM LTX FREE POLYISOPRENE POLYMER BEAD ANTI

## (undated) DEVICE — FORCEPS SURG 25GA FN TIP ECKARDT INT LIMITING MEMBRN PINN

## (undated) DEVICE — SOLUTION IRRIGATION BAL SALT SOLUTION 500 ML BTL 6/CA BSS +

## (undated) DEVICE — OCCUCOAT SYRINGE 1ML 6PK: Brand: OCCUCOAT

## (undated) DEVICE — STRAP,POSITIONING,KNEE/BODY,FOAM,4X60": Brand: MEDLINE

## (undated) DEVICE — RETINA PK

## (undated) DEVICE — STANDARD HYPODERMIC NEEDLE,ALUMINUM HUB: Brand: MONOJECT

## (undated) DEVICE — GLOVE ORANGE PI 7 1/2   MSG9075

## (undated) DEVICE — SYRINGE MED 50ML LUERLOCK TIP

## (undated) DEVICE — GOWN,SIRUS,NONRNF,SETINSLV,XL,20/CS: Brand: MEDLINE

## (undated) DEVICE — GLOVE SURG SZ 65 THK91MIL LTX FREE SYN POLYISOPRENE

## (undated) DEVICE — SYRINGE, LUER LOCK, 10ML: Brand: MEDLINE

## (undated) DEVICE — REVOLUTION DSP 25G ILM FORCEPS: Brand: ALCON GRIESHABER REVOLUTION

## (undated) DEVICE — GOWN,AURORA,NONREINFORCED,LARGE: Brand: MEDLINE

## (undated) DEVICE — 25 GA FLEXIBLE TIP LASER PROBE: Brand: ALCON, ENGAUGE

## (undated) DEVICE — SYRINGE ST FILTERS W/MCE MEMBRAN